# Patient Record
Sex: FEMALE | Race: WHITE
[De-identification: names, ages, dates, MRNs, and addresses within clinical notes are randomized per-mention and may not be internally consistent; named-entity substitution may affect disease eponyms.]

---

## 2019-12-09 NOTE — OPERATIVE REPORT
DATE OF PROCEDURE:  12/09/2019

 

SURGEON:  Cornel Garcia DPM

 

Operative report for Dr. Marmolejo.

 

PREOPERATIVE DIAGNOSIS:  Left Achilles tendon rupture.

 

POSTOPERATIVE DIAGNOSIS:  Left Achilles tendon rupture.

 

PLANNED PROCEDURE:  Left Achilles tendon repair.

 

ASSISTANT:  Cornel Garcia DPM

 

ANESTHESIA:  General with a postoperative block consisting of 20 mL of 0.5% Marcaine

plain. 

 

ESTIMATED BLOOD LOSS:  Less than 10 mL.

 

MATERIALS:  0 Ethibond, 3-0 Vicryl, 3-0 nylon.

 

PATHOLOGY:  None.

 

PROCEDURE NOTE:  The patient was seen in the preoperative waiting room where the correct

procedure and site were identified.  The patient was brought to the operating room where

general anesthesia was initiated at this time, and a well-padded pneumatic tourniquet

was placed about the patient's left thigh.  The left foot, ankle, and leg were then

scrubbed, prepped, and draped in the usual aseptic manner.  The patient was flipped to

the prone position onto the operating table.  By utilizing an Esmarch bandage, the left

lower extremity was exsanguinated and the left thigh tourniquet was inflated to 350 mmHg

for a total time of approximately 40 minutes.  Attention was directed to the posterior

aspect of the patient's left Achilles tendon insertion site where an 8 cm linear

incision made directly over the distal aspect of the Achilles tendon to the level of the

insertion.  Incision was carried through the subcutaneous tissue  them from

deep or underlying structures.  All vital neurovascular structures were identified,

retracted medially and laterally and all bleeders were cauterized or ligated as deemed

necessary.  At this time, the peritoneum was noted inside.  There was noted to be large

hematoma formation.  The Achilles tendon rupture site was easily identified.  There was

noted to be moderate spaghetti string appearance of the proximal edge of the rupture

site.  There was noted to be moderate hematoma formation.  The plantaris muscle appeared

to be intact.  The wound was then copiously irrigated with sterile saline.  The wound

was debrided of all necrotic and devitalized tissues and spaghetti string attachments

were removed.  Next, utilizing a Krackow technique, the proximal rupture site was

sutured, followed by the distal side in the same Krackow technique and reapproximated by

suturing both ends together.  Next, simple interrupted sutures were placed directly

along the rupture site for further strength.  The wound was again copiously irrigated

with sterile saline.  Capsule, deep tissue and paratenon were reapproximated with 3-0

Vicryl, subcutaneous tissue with 3-0 Vicryl, and the skin was closed using simple

interrupted sutures with 3-0 nylon.  The incision site was then dressed with Adaptic,

4x4s, Kerlix, Webril, 4 x 30 posterior splint, 4 inch Ace wrap, and a 6-inch Ace wrap. 

 

The patient tolerated the procedure and anesthesia well.  The patient was transferred to

the postoperative recovery room with vital signs stable and vascular status intact.  The

patient was monitored there for a short period time before being sent home with the

following written and oral instructions: 

1. Keep the dressing clean, dry, and intact.

2. The patient is to remain nonweightbearing to the left lower extremity to avoid any 

ambulation until being seen in the office.

3. The patient was given the office number and instructed to contact us if any problems

arise. 

 

 

 

 

______________________________

YASMEEN Larios/PHILLY

D:  12/09/2019 07:48:35

T:  12/09/2019 11:33:32

Job #:  [id number]

## 2022-08-16 ENCOUNTER — HOSPITAL ENCOUNTER (OUTPATIENT)
Dept: HOSPITAL 88 - MAMMO | Age: 52
End: 2022-08-16
Payer: COMMERCIAL

## 2022-08-16 DIAGNOSIS — Z12.31: Primary | ICD-10-CM

## 2022-08-16 DIAGNOSIS — M85.88: ICD-10-CM

## 2022-08-16 PROCEDURE — 77080 DXA BONE DENSITY AXIAL: CPT

## 2022-08-16 PROCEDURE — 77067 SCR MAMMO BI INCL CAD: CPT

## 2024-08-16 ENCOUNTER — HOSPITAL ENCOUNTER (OUTPATIENT)
Dept: HOSPITAL 88 - MAMMO | Age: 54
End: 2024-08-16
Payer: COMMERCIAL

## 2024-08-16 DIAGNOSIS — Z12.31: Primary | ICD-10-CM

## 2024-08-16 PROCEDURE — 77067 SCR MAMMO BI INCL CAD: CPT
